# Patient Record
Sex: FEMALE | URBAN - METROPOLITAN AREA
[De-identification: names, ages, dates, MRNs, and addresses within clinical notes are randomized per-mention and may not be internally consistent; named-entity substitution may affect disease eponyms.]

---

## 2022-06-25 ENCOUNTER — NURSE TRIAGE (OUTPATIENT)
Dept: NURSING | Facility: CLINIC | Age: 37
End: 2022-06-25

## 2022-06-25 NOTE — TELEPHONE ENCOUNTER
Pt's sister Kelly is calling. No consent on file.  She is there and gives verbal consent to speak with her sister Kelly.    Bit by something on Wednesday night while sleeping. No sure what bit her on the top of her right foot in the middle of it. Now that area of her foot is swollen up to 1.5 inches away from the bite. Where she was bit is looking bruised, and swollen around it.  It is not hot to the touch. Unsure if it is red as she is very tanned and uses a body glow shimmering self haydee. They can notice a color change, just not sure if red/purple, or black.  It is not painful to the touch. Only Mildly painful when turning her foot the left.  No fever or chills. She is feeling fine.    Care advice reviewed. I advised her to have her to be seen in urgent care tomorrow, to see if antibiotics may be needed. Ice to the area.  She verbalized understanding and agreed to take her to be seen tomorrow.  Call back or be seen sooner with fever, severe pain, redness that is spreading.  She verbalized understanding.      Chitra Rodriguez RN  [facility] Nurse Advisor  6/25/2022 at 6:33 PM      COVID 19 Nurse Triage Plan/Patient Instructions    Please be aware that novel coronavirus (COVID-19) may be circulating in the community. If you develop symptoms such as fever, cough, or SOB or if you have concerns about the presence of another infection including coronavirus (COVID-19), please contact your health care provider or visit [email or web address].     Disposition/Instructions    In-Person Visit with provider recommended. Reference Visit Selection Guide.    Thank you for taking steps to prevent the spread of this virus.  o Limit your contact with others.  o Wear a simple mask to cover your cough.  o Wash your hands well and often.    Resources    [facility]: About COVID-19: [email or web address]    CDC: What to Do If You're Sick:  www.cdc.gov/coronavirus/2019-ncov/about/steps-when-sick.html    CDC: Ending Home Isolation: www.cdc.gov/coronavirus/2019-ncov/hcp/disposition-in-home-patients.html     CDC: Caring for Someone: www.cdc.gov/coronavirus/2019-ncov/if-you-are-sick/care-for-someone.html     [facility]: Interim Guidance for Hospital Discharge to Home: www.[email or web address].[email or web address]./diseases/[email or web address]/hcp/hospdischarge.pdf    [facility] clinical trials (COVID-19 research studies): [email or web address]-[email or web address]-[email or web address]     Below are the COVID-19 hotlines at the [place] [facility] ([facility]). Interpreters are available.   o For health questions: Call 246-421-1147 or 1-276.976.2065 (7 a.m. to 7 p.m.)  o For questions about schools and childcare: Call 073-014-2959 or 1-556.177.5845 (7 a.m. to 7 p.m.)    Reason for Disposition    [1] Red or very tender (to touch) area AND [2] started over 24 hours after the bite    Additional Information    Negative: [1] Life-threatening reaction (anaphylaxis) in the past to same insect bite AND [2] < 2 hours since bite    Negative: Passed out (i.e., lost consciousness, collapsed and was not responding)    Negative: Difficulty breathing or wheezing    Negative: [1] Hoarseness or cough AND [2] sudden onset following bite    Negative: [1] Difficulty swallowing or slurred speech AND [2] sudden onset following bite    Negative: Sounds like a life-threatening emergency to the triager    Negative: Bee sting(s)    Negative: Spider bite(s)    Negative: Tick bite(s)    Negative: Mosquito bite(s)    Negative: Bed bug bite(s)    Negative: Boil suspected (i.e., painful red lump and NO insect bite)    Negative: Doesn't sound like an insect bite    Negative: Patient sounds very sick or weak to the triager    Negative: [1] SEVERE bite pain AND [2] not improved after 2 hours of pain medicine    Negative: [1] Fever AND [2] red area    Negative: [1] Fever AND [2] area is very tender to touch    Negative: [1] Red  streak or red line AND [2] length > 2 inches (5 cm)    Protocols used: INSECT BITE-A-AH